# Patient Record
Sex: FEMALE | Race: WHITE
[De-identification: names, ages, dates, MRNs, and addresses within clinical notes are randomized per-mention and may not be internally consistent; named-entity substitution may affect disease eponyms.]

---

## 2022-01-03 ENCOUNTER — HOSPITAL ENCOUNTER (INPATIENT)
Dept: HOSPITAL 4 - SED | Age: 82
LOS: 5 days | Discharge: HOSPICE | DRG: 309 | End: 2022-01-08
Attending: INTERNAL MEDICINE | Admitting: INTERNAL MEDICINE
Payer: COMMERCIAL

## 2022-01-03 VITALS — HEIGHT: 62 IN | BODY MASS INDEX: 25.03 KG/M2 | WEIGHT: 136 LBS

## 2022-01-03 VITALS — SYSTOLIC BLOOD PRESSURE: 92 MMHG

## 2022-01-03 DIAGNOSIS — Z20.822: ICD-10-CM

## 2022-01-03 DIAGNOSIS — I42.0: ICD-10-CM

## 2022-01-03 DIAGNOSIS — Z79.01: ICD-10-CM

## 2022-01-03 DIAGNOSIS — Z86.73: ICD-10-CM

## 2022-01-03 DIAGNOSIS — R62.7: ICD-10-CM

## 2022-01-03 DIAGNOSIS — I13.0: ICD-10-CM

## 2022-01-03 DIAGNOSIS — I48.91: Primary | ICD-10-CM

## 2022-01-03 DIAGNOSIS — F03.90: ICD-10-CM

## 2022-01-03 DIAGNOSIS — E03.9: ICD-10-CM

## 2022-01-03 DIAGNOSIS — E86.0: ICD-10-CM

## 2022-01-03 DIAGNOSIS — I50.9: ICD-10-CM

## 2022-01-03 DIAGNOSIS — N18.2: ICD-10-CM

## 2022-01-03 DIAGNOSIS — N28.9: ICD-10-CM

## 2022-01-03 PROCEDURE — G0378 HOSPITAL OBSERVATION PER HR: HCPCS

## 2022-01-04 VITALS — SYSTOLIC BLOOD PRESSURE: 105 MMHG

## 2022-01-04 VITALS — SYSTOLIC BLOOD PRESSURE: 86 MMHG

## 2022-01-04 LAB
ALBUMIN SERPL BCP-MCNC: 3.3 G/DL (ref 3.4–4.8)
ALBUMIN SERPL BCP-MCNC: 3.4 G/DL (ref 3.4–4.8)
ALT SERPL W P-5'-P-CCNC: 281 U/L (ref 12–78)
ALT SERPL W P-5'-P-CCNC: 287 U/L (ref 12–78)
ANION GAP SERPL CALCULATED.3IONS-SCNC: 11 MMOL/L (ref 5–15)
ANION GAP SERPL CALCULATED.3IONS-SCNC: 14 MMOL/L (ref 5–15)
APPEARANCE UR: CLEAR
AST SERPL W P-5'-P-CCNC: 178 U/L (ref 10–37)
AST SERPL W P-5'-P-CCNC: 214 U/L (ref 10–37)
BACTERIA URNS QL MICRO: (no result) /HPF
BASOPHILS # BLD AUTO: 0 K/UL (ref 0–0.2)
BASOPHILS # BLD AUTO: 0 K/UL (ref 0–0.2)
BASOPHILS NFR BLD AUTO: 0.1 % (ref 0–2)
BASOPHILS NFR BLD AUTO: 0.3 % (ref 0–2)
BILIRUB SERPL-MCNC: 1 MG/DL (ref 0–1)
BILIRUB SERPL-MCNC: 1.1 MG/DL (ref 0–1)
BILIRUB UR QL STRIP: NEGATIVE
BUN SERPL-MCNC: 45 MG/DL (ref 8–21)
BUN SERPL-MCNC: 46 MG/DL (ref 8–21)
CALCIUM SERPL-MCNC: 9.4 MG/DL (ref 8.4–11)
CALCIUM SERPL-MCNC: 9.7 MG/DL (ref 8.4–11)
CHLORIDE SERPL-SCNC: 101 MMOL/L (ref 98–107)
CHLORIDE SERPL-SCNC: 98 MMOL/L (ref 98–107)
COLOR UR: YELLOW
CREAT SERPL-MCNC: 1.5 MG/DL (ref 0.55–1.3)
CREAT SERPL-MCNC: 1.59 MG/DL (ref 0.55–1.3)
DIGOXIN SERPL-MCNC: < 0.1 NG/ML (ref 0.8–2)
EOSINOPHIL # BLD AUTO: 0.1 K/UL (ref 0–0.4)
EOSINOPHIL # BLD AUTO: 0.1 K/UL (ref 0–0.4)
EOSINOPHIL NFR BLD AUTO: 0.8 % (ref 0–4)
EOSINOPHIL NFR BLD AUTO: 1.1 % (ref 0–4)
ERYTHROCYTE [DISTWIDTH] IN BLOOD BY AUTOMATED COUNT: 17.1 % (ref 9–15)
ERYTHROCYTE [DISTWIDTH] IN BLOOD BY AUTOMATED COUNT: 17.8 % (ref 9–15)
GFR SERPL CREATININE-BSD FRML MDRD: (no result) ML/MIN (ref 90–?)
GFR SERPL CREATININE-BSD FRML MDRD: (no result) ML/MIN (ref 90–?)
GLUCOSE SERPL-MCNC: 107 MG/DL (ref 70–99)
GLUCOSE SERPL-MCNC: 108 MG/DL (ref 70–99)
GLUCOSE UR STRIP-MCNC: NEGATIVE MG/DL
HCT VFR BLD AUTO: 48.3 % (ref 36–48)
HCT VFR BLD AUTO: 51.6 % (ref 36–48)
HGB BLD-MCNC: 15.9 G/DL (ref 12–16)
HGB BLD-MCNC: 16.7 G/DL (ref 12–16)
HGB UR QL STRIP: NEGATIVE
KETONES UR STRIP-MCNC: NEGATIVE MG/DL
LEUKOCYTE ESTERASE UR QL STRIP: NEGATIVE
LYMPHOCYTES # BLD AUTO: 1 K/UL (ref 1–5.5)
LYMPHOCYTES # BLD AUTO: 1 K/UL (ref 1–5.5)
LYMPHOCYTES NFR BLD AUTO: 13.1 % (ref 20.5–51.5)
LYMPHOCYTES NFR BLD AUTO: 15.3 % (ref 20.5–51.5)
MCH RBC QN AUTO: 31 PG (ref 27–31)
MCH RBC QN AUTO: 31 PG (ref 27–31)
MCHC RBC AUTO-ENTMCNC: 32 % (ref 32–36)
MCHC RBC AUTO-ENTMCNC: 33 % (ref 32–36)
MCV RBC AUTO: 94 FL (ref 79–98)
MCV RBC AUTO: 95 FL (ref 79–98)
MONOCYTES # BLD MANUAL: 0.5 K/UL (ref 0–1)
MONOCYTES # BLD MANUAL: 0.5 K/UL (ref 0–1)
MONOCYTES # BLD MANUAL: 6.1 % (ref 1.7–9.3)
MONOCYTES # BLD MANUAL: 7.5 % (ref 1.7–9.3)
MUCOUS THREADS URNS QL MICRO: (no result) /LPF
NEUTROPHILS # BLD AUTO: 5 K/UL (ref 1.8–7.7)
NEUTROPHILS # BLD AUTO: 5.9 K/UL (ref 1.8–7.7)
NEUTROPHILS NFR BLD AUTO: 75.8 % (ref 40–70)
NEUTROPHILS NFR BLD AUTO: 79.9 % (ref 40–70)
NITRITE UR QL STRIP: NEGATIVE
PH UR STRIP: 6 [PH] (ref 5–8)
PLATELET # BLD AUTO: 133 K/UL (ref 130–430)
PLATELET # BLD AUTO: 137 K/UL (ref 130–430)
POTASSIUM SERPL-SCNC: 4.1 MMOL/L (ref 3.5–5.1)
POTASSIUM SERPL-SCNC: 4.6 MMOL/L (ref 3.5–5.1)
PROT UR QL STRIP: (no result)
RBC # BLD AUTO: 5.13 MIL/UL (ref 4.2–6.2)
RBC # BLD AUTO: 5.45 MIL/UL (ref 4.2–6.2)
RBC #/AREA URNS HPF: (no result) /HPF (ref 0–3)
SODIUM SERPLBLD-SCNC: 135 MMOL/L (ref 136–145)
SODIUM SERPLBLD-SCNC: 136 MMOL/L (ref 136–145)
SP GR UR STRIP: >=1.03 (ref 1–1.03)
T4 FREE SERPL-MCNC: 1.1 NG/DL (ref 0.8–1.5)
TSH SERPL DL<=0.05 MIU/L-ACNC: 8.36 UIU/ML (ref 0.36–3.74)
UROBILINOGEN UR STRIP-MCNC: 0.2 MG/DL (ref 0.2–1)
WBC # BLD AUTO: 6.6 K/UL (ref 4.8–10.8)
WBC # BLD AUTO: 7.4 K/UL (ref 4.8–10.8)
WBC #/AREA URNS HPF: (no result) /HPF (ref 0–3)

## 2022-01-04 RX ADMIN — MIRTAZAPINE SCH MG: 15 TABLET, FILM COATED ORAL at 21:06

## 2022-01-04 RX ADMIN — SODIUM CHLORIDE SCH MLS/HR: 9 INJECTION, SOLUTION INTRAVENOUS at 15:49

## 2022-01-04 RX ADMIN — CARVEDILOL SCH MG: 6.25 TABLET, FILM COATED ORAL at 21:38

## 2022-01-04 RX ADMIN — RIVAROXABAN SCH MG: 15 TABLET, FILM COATED ORAL at 18:37

## 2022-01-04 RX ADMIN — DILTIAZEM HYDROCHLORIDE SCH MG: 30 TABLET, FILM COATED ORAL at 22:36

## 2022-01-04 RX ADMIN — DILTIAZEM HYDROCHLORIDE SCH MG: 30 TABLET, FILM COATED ORAL at 15:59

## 2022-01-04 NOTE — NUR
Patient to ER bed 7 to gown for evaluation. Side rails up.

Report given to self. [Courtesy Letter:] : I had the pleasure of seeing your patient, [unfilled], in my office today. [Please see my note below.] : Please see my note below. [Dear  ___] : Dear  [unfilled], [Sincerely,] : Sincerely, [Consult Closing:] : Thank you very much for allowing me to participate in the care of this patient.  If you have any questions, please do not hesitate to contact me. [FreeTextEntry2] : Kajal Pope MD\par [address] \par [place] [address]\par Phone: [phone]  [FreeTextEntry3] : Pippa Madison MD, MPH\par Attending Physician\par [facility]\par Hematology /Oncology and Stem Cell Transplantation\par  of Pediatrics\par [employer] [facility]

## 2022-01-04 NOTE — NUR
Received pt from ER at 1345 on stretcher.  A+Ox1- name only. Pt denies pain. IV noted to R 
arm. CSMW- pt appears pale. No headahce, dizziness, chest pain, numbness, tingling or edema. 
Lungs diminished No SOB/cough noted. 100% RA. No palpitations reported from pt. BSx4. LBM 
Dennys 3/22. Void QS. No N+V. No skin concerns. L breast swollen/firm. Not OOB. Tele insitu. pt 
knows top ring call bell to get up. Bed alarm on. VSS. Will continue to monitor.

## 2022-01-04 NOTE — NUR
Spoke with Dr Villalobos to inform patient is is AFib confirmed by stat EKG done. 
Orders received. This RN to carry out. 

-------------------------------------------------------------------------------

Addendum: 01/04/22 at 1239 by [email or web address]

-------------------------------------------------------------------------------

*Is in

## 2022-01-04 NOTE — NUR
Pt resting in bed. No voiced concerns. Bed in low position. Call bell in reach. Bed alarm 
on. IVF infusing. Will continue to monitor.

## 2022-01-04 NOTE — NUR
Patient will be admitted to care of Dr Villalobos.  Admitted to med surg unit.  
Will go to room 130A.  Belongings list completed.  Complete and up to date 
summary report printed. SBAR report to be given at bedside with opportunity for 
questions.

## 2022-01-04 NOTE — NUR
# 20 gauge angiocath placed to 20.  Use of asceptic technique.  Opsite placed 
over site.  Blood return noted.  Flushed with 10 cc of normal saline.  No 
evidence of infiltration noted.  Patient tolerated well.

## 2022-01-04 NOTE — NUR
Made contact with patient. Easily arousable to voice. Alert and oriented to 
person, place and situation. COVID swab collected at bedside and sent to lab. 
Informed we are awaiting results to send her to hospital room. Understanding 
verbalized. Will continue to monitor.

## 2022-01-05 VITALS — SYSTOLIC BLOOD PRESSURE: 114 MMHG

## 2022-01-05 VITALS — SYSTOLIC BLOOD PRESSURE: 105 MMHG

## 2022-01-05 VITALS — SYSTOLIC BLOOD PRESSURE: 137 MMHG

## 2022-01-05 VITALS — SYSTOLIC BLOOD PRESSURE: 96 MMHG

## 2022-01-05 VITALS — SYSTOLIC BLOOD PRESSURE: 156 MMHG

## 2022-01-05 LAB
ALBUMIN SERPL BCP-MCNC: 2.9 G/DL (ref 3.4–4.8)
ALT SERPL W P-5'-P-CCNC: 215 U/L (ref 12–78)
ANION GAP SERPL CALCULATED.3IONS-SCNC: 15 MMOL/L (ref 5–15)
AST SERPL W P-5'-P-CCNC: 116 U/L (ref 10–37)
BASOPHILS # BLD AUTO: 0 K/UL (ref 0–0.2)
BASOPHILS NFR BLD AUTO: 0.1 % (ref 0–2)
BILIRUB SERPL-MCNC: 1 MG/DL (ref 0–1)
BUN SERPL-MCNC: 43 MG/DL (ref 8–21)
CALCIUM SERPL-MCNC: 8.5 MG/DL (ref 8.4–11)
CHLORIDE SERPL-SCNC: 102 MMOL/L (ref 98–107)
CREAT SERPL-MCNC: 1.32 MG/DL (ref 0.55–1.3)
EOSINOPHIL # BLD AUTO: 0 K/UL (ref 0–0.4)
EOSINOPHIL NFR BLD AUTO: 0.4 % (ref 0–4)
ERYTHROCYTE [DISTWIDTH] IN BLOOD BY AUTOMATED COUNT: 16.8 % (ref 9–15)
GFR SERPL CREATININE-BSD FRML MDRD: (no result) ML/MIN (ref 90–?)
GLUCOSE SERPL-MCNC: 103 MG/DL (ref 70–99)
HCT VFR BLD AUTO: 45.6 % (ref 36–48)
HGB BLD-MCNC: 15.1 G/DL (ref 12–16)
LIPASE SERPL-CCNC: 373 U/L (ref 73–393)
LYMPHOCYTES # BLD AUTO: 0.7 K/UL (ref 1–5.5)
LYMPHOCYTES NFR BLD AUTO: 11.1 % (ref 20.5–51.5)
MCH RBC QN AUTO: 31 PG (ref 27–31)
MCHC RBC AUTO-ENTMCNC: 33 % (ref 32–36)
MCV RBC AUTO: 93 FL (ref 79–98)
MONOCYTES # BLD MANUAL: 0.3 K/UL (ref 0–1)
MONOCYTES # BLD MANUAL: 5 % (ref 1.7–9.3)
NEUTROPHILS # BLD AUTO: 5.5 K/UL (ref 1.8–7.7)
NEUTROPHILS NFR BLD AUTO: 83.4 % (ref 40–70)
PLATELET # BLD AUTO: 113 K/UL (ref 130–430)
POTASSIUM SERPL-SCNC: 4.5 MMOL/L (ref 3.5–5.1)
RBC # BLD AUTO: 4.91 MIL/UL (ref 4.2–6.2)
SODIUM SERPLBLD-SCNC: 133 MMOL/L (ref 136–145)
WBC # BLD AUTO: 6.6 K/UL (ref 4.8–10.8)

## 2022-01-05 RX ADMIN — RIVAROXABAN SCH MG: 15 TABLET, FILM COATED ORAL at 18:14

## 2022-01-05 RX ADMIN — MIRTAZAPINE SCH MG: 15 TABLET, FILM COATED ORAL at 21:04

## 2022-01-05 RX ADMIN — DILTIAZEM HYDROCHLORIDE SCH MG: 30 TABLET, FILM COATED ORAL at 15:01

## 2022-01-05 RX ADMIN — SODIUM CHLORIDE SCH MLS/HR: 9 INJECTION, SOLUTION INTRAVENOUS at 03:09

## 2022-01-05 RX ADMIN — CARVEDILOL SCH MG: 6.25 TABLET, FILM COATED ORAL at 21:04

## 2022-01-05 RX ADMIN — DILTIAZEM HYDROCHLORIDE SCH MG: 30 TABLET, FILM COATED ORAL at 06:33

## 2022-01-05 RX ADMIN — DILTIAZEM HYDROCHLORIDE SCH MG: 30 TABLET, FILM COATED ORAL at 22:00

## 2022-01-05 RX ADMIN — CARVEDILOL SCH MG: 6.25 TABLET, FILM COATED ORAL at 08:27

## 2022-01-05 RX ADMIN — FAMOTIDINE SCH MG: 20 TABLET, FILM COATED ORAL at 08:26

## 2022-01-05 NOTE — NUR
NOTES:

VS checked. ambulated with assist to the restroom and voided. on cardiac monitor and shows 
atrial fib with RVR.on room air, no sob.

## 2022-01-05 NOTE — NUR
Call placed to [facility] [phone]-patient's daughter to discuss DC planning-left 
message and asked for return call

## 2022-01-05 NOTE — NUR
Pt laying in bed at 0700. A+Ox3- not to time. Pt denies pain. IV noted- S/L. CSMW- pt 
appears pale. No headahce, dizziness, chest pain, numbness, tingling or edema. Lungs 
diminished. No SOB/cough noted. 93% RA. No palpitations reported from pt. BSx4. LBM Dennys 
3/22. Void QS. No N+V. No skin concerns. Not OOB. Tele insitu. pt knows top ring call bell 
to get up. R arm dressing from tape being pulled off/IV. Bed alarm on. VSS. Will continue to 
monitor.

## 2022-01-05 NOTE — NUR
CHANGE OF SHOFT;

endorsed by day shift, no IV access, called Dr. Villalobos x4 on day shift but no response. no 
distress. call ligth within reach.

## 2022-01-05 NOTE — NUR
Pt resting in bed. No voiced concerns. Bed in low position. Call bell in reach. Bed alarm 
on. Will continue to monitor.

## 2022-01-05 NOTE — NUR
NOTES;

charge nurse and another RN  attempted to insert IV but unsuccessful. called Dr. Mckeon, 
on call for Dr. Villalobos. informed about no IV access, encourage oral fluids.

## 2022-01-06 VITALS — SYSTOLIC BLOOD PRESSURE: 97 MMHG

## 2022-01-06 VITALS — SYSTOLIC BLOOD PRESSURE: 128 MMHG

## 2022-01-06 RX ADMIN — CARVEDILOL SCH MG: 6.25 TABLET, FILM COATED ORAL at 09:52

## 2022-01-06 RX ADMIN — FAMOTIDINE SCH MG: 20 TABLET, FILM COATED ORAL at 08:58

## 2022-01-06 RX ADMIN — DILTIAZEM HYDROCHLORIDE SCH MG: 30 TABLET, FILM COATED ORAL at 05:58

## 2022-01-06 RX ADMIN — SODIUM CHLORIDE SCH MLS/HR: 9 INJECTION, SOLUTION INTRAVENOUS at 01:32

## 2022-01-06 RX ADMIN — DILTIAZEM HYDROCHLORIDE SCH MG: 30 TABLET, FILM COATED ORAL at 21:52

## 2022-01-06 RX ADMIN — MIRTAZAPINE SCH MG: 15 TABLET, FILM COATED ORAL at 21:44

## 2022-01-06 RX ADMIN — DILTIAZEM HYDROCHLORIDE SCH MG: 30 TABLET, FILM COATED ORAL at 14:58

## 2022-01-06 RX ADMIN — FAMOTIDINE SCH MG: 20 TABLET, FILM COATED ORAL at 09:02

## 2022-01-06 RX ADMIN — RIVAROXABAN SCH MG: 15 TABLET, FILM COATED ORAL at 21:50

## 2022-01-06 NOTE — NUR
NOTES:

pt. awakened, replace battery on the tele monitor. quite disoriented, reoriented to place. 
condition observed.

## 2022-01-06 NOTE — NUR
Dietitian Recommendations



* Regular diet w/ Ensure Enlive TID

(ONS yields 1050 kcal/day, 60 gm protein/day)

* Encourage increase PO intakes

LP, RD



Please refer to Nutrition Assessment for details.

-------------------------------------------------------------------------------

Addendum: 01/06/22 at 1809 by Carmen Carmichael RD

-------------------------------------------------------------------------------

Amended: Links added.

## 2022-01-06 NOTE — NUR
CHANGE OF SHIFT;

endorsed by day shift. did not void all day. no acute distress. bed alarm on, fall risk.

## 2022-01-06 NOTE — NUR
NOTES:

pt. called, pt. assisted to [facility], very weak to ambulate. pt. voided with CNA help. still no 
IV access. encourage oral liquids. on cardiac monitor and shows atrial fib/RVR. denies any 
pain. appears drowsy and sleepy. bed alarm on, fall risk.

## 2022-01-06 NOTE — NUR
CLOSING NOTES;

pt. called and assisted to the restroom, pt. very unsteady and weak. fall risk. still no IV 
access. for further care and assistance. bed aalarm on.

## 2022-01-07 VITALS — SYSTOLIC BLOOD PRESSURE: 107 MMHG

## 2022-01-07 VITALS — SYSTOLIC BLOOD PRESSURE: 116 MMHG

## 2022-01-07 VITALS — SYSTOLIC BLOOD PRESSURE: 120 MMHG

## 2022-01-07 VITALS — SYSTOLIC BLOOD PRESSURE: 155 MMHG

## 2022-01-07 VITALS — SYSTOLIC BLOOD PRESSURE: 108 MMHG

## 2022-01-07 VITALS — SYSTOLIC BLOOD PRESSURE: 118 MMHG

## 2022-01-07 LAB
ALBUMIN SERPL BCP-MCNC: 3.4 G/DL (ref 3.4–4.8)
ALT SERPL W P-5'-P-CCNC: 227 U/L (ref 12–78)
ANION GAP SERPL CALCULATED.3IONS-SCNC: 17 MMOL/L (ref 5–15)
AST SERPL W P-5'-P-CCNC: 106 U/L (ref 10–37)
BASOPHILS # BLD AUTO: 0 K/UL (ref 0–0.2)
BASOPHILS NFR BLD AUTO: 0.5 % (ref 0–2)
BILIRUB SERPL-MCNC: 1.1 MG/DL (ref 0–1)
BUN SERPL-MCNC: 47 MG/DL (ref 8–21)
CALCIUM SERPL-MCNC: 9 MG/DL (ref 8.4–11)
CHLORIDE SERPL-SCNC: 101 MMOL/L (ref 98–107)
CREAT SERPL-MCNC: 1.41 MG/DL (ref 0.55–1.3)
EOSINOPHIL # BLD AUTO: 0 K/UL (ref 0–0.4)
EOSINOPHIL NFR BLD AUTO: 0.3 % (ref 0–4)
ERYTHROCYTE [DISTWIDTH] IN BLOOD BY AUTOMATED COUNT: 17.3 % (ref 9–15)
GFR SERPL CREATININE-BSD FRML MDRD: (no result) ML/MIN (ref 90–?)
GLUCOSE SERPL-MCNC: 98 MG/DL (ref 70–99)
HCT VFR BLD AUTO: 52.2 % (ref 36–48)
HGB BLD-MCNC: 17 G/DL (ref 12–16)
LYMPHOCYTES # BLD AUTO: 0.8 K/UL (ref 1–5.5)
LYMPHOCYTES NFR BLD AUTO: 11.1 % (ref 20.5–51.5)
MCH RBC QN AUTO: 31 PG (ref 27–31)
MCHC RBC AUTO-ENTMCNC: 33 % (ref 32–36)
MCV RBC AUTO: 94 FL (ref 79–98)
MONOCYTES # BLD MANUAL: 0.5 K/UL (ref 0–1)
MONOCYTES # BLD MANUAL: 6.1 % (ref 1.7–9.3)
NEUTROPHILS # BLD AUTO: 6.2 K/UL (ref 1.8–7.7)
NEUTROPHILS NFR BLD AUTO: 82 % (ref 40–70)
PLATELET # BLD AUTO: 116 K/UL (ref 130–430)
POTASSIUM SERPL-SCNC: 5.8 MMOL/L (ref 3.5–5.1)
RBC # BLD AUTO: 5.56 MIL/UL (ref 4.2–6.2)
SODIUM SERPLBLD-SCNC: 134 MMOL/L (ref 136–145)
WBC # BLD AUTO: 7.5 K/UL (ref 4.8–10.8)

## 2022-01-07 RX ADMIN — FUROSEMIDE SCH MG: 40 TABLET ORAL at 09:00

## 2022-01-07 RX ADMIN — FAMOTIDINE SCH MG: 20 TABLET, FILM COATED ORAL at 09:25

## 2022-01-07 RX ADMIN — RIVAROXABAN SCH MG: 15 TABLET, FILM COATED ORAL at 18:08

## 2022-01-07 RX ADMIN — CARVEDILOL SCH MG: 6.25 TABLET, FILM COATED ORAL at 09:00

## 2022-01-07 RX ADMIN — DILTIAZEM HYDROCHLORIDE SCH MG: 30 TABLET, FILM COATED ORAL at 05:53

## 2022-01-07 RX ADMIN — DILTIAZEM HYDROCHLORIDE SCH MG: 30 TABLET, FILM COATED ORAL at 14:12

## 2022-01-07 RX ADMIN — FUROSEMIDE SCH MG: 40 TABLET ORAL at 11:09

## 2022-01-07 RX ADMIN — CARVEDILOL SCH MG: 6.25 TABLET, FILM COATED ORAL at 11:10

## 2022-01-07 RX ADMIN — DILTIAZEM HYDROCHLORIDE SCH MG: 30 TABLET, FILM COATED ORAL at 22:34

## 2022-01-07 RX ADMIN — CARVEDILOL SCH MG: 6.25 TABLET, FILM COATED ORAL at 00:25

## 2022-01-07 RX ADMIN — MIRTAZAPINE SCH MG: 15 TABLET, FILM COATED ORAL at 21:33

## 2022-01-07 NOTE — NUR
CLOSING NOTES;

pt. been sleeping most of the night but arousable. been encouraging oral fluids. due 
medication given. HR still above 100. for further care and assist. will endorse to incoming 
shift. bed alarm on.

## 2022-01-07 NOTE — NUR
Nutrition Update



Chas Scale 18 noted.

Pt admitted for Dehydration

Diet: Regular

BMI: 24.9 kg/m2

RD to follow per nutrition care standards.

## 2022-01-07 NOTE — NUR
attempted to sit down on chair, tolerated but no sitter to watch her so, placed back to bed, 
she removes telebox, need to monitor and check every now and then. Heart rate still high at 
more than 100bpm, refused lunch.

## 2022-01-08 VITALS — SYSTOLIC BLOOD PRESSURE: 131 MMHG

## 2022-01-08 VITALS — SYSTOLIC BLOOD PRESSURE: 105 MMHG

## 2022-01-08 VITALS — SYSTOLIC BLOOD PRESSURE: 150 MMHG

## 2022-01-08 RX ADMIN — CARVEDILOL SCH MG: 6.25 TABLET, FILM COATED ORAL at 13:51

## 2022-01-08 RX ADMIN — FAMOTIDINE SCH MG: 20 TABLET, FILM COATED ORAL at 08:41

## 2022-01-08 RX ADMIN — DILTIAZEM HYDROCHLORIDE SCH MG: 30 TABLET, FILM COATED ORAL at 13:52

## 2022-01-08 RX ADMIN — DILTIAZEM HYDROCHLORIDE SCH MG: 30 TABLET, FILM COATED ORAL at 05:05

## 2022-01-08 RX ADMIN — FUROSEMIDE SCH MG: 40 TABLET ORAL at 08:41

## 2022-01-08 RX ADMIN — RIVAROXABAN SCH MG: 15 TABLET, FILM COATED ORAL at 17:54

## 2022-01-08 RX ADMIN — CARVEDILOL SCH MG: 6.25 TABLET, FILM COATED ORAL at 09:00

## 2022-01-08 NOTE — NUR
Hospice Agency:

Spoke with Kimberly from [facility] and has arranged home Hospice and  transport 
coming in 2hours and daughter Magdalena was informed.Instructions from [facility] to 
fax patient's 24 hours home medications list to Fax# [phone]#[phone].

## 2022-01-08 NOTE — NUR
PATIENT PULLING OUT TELE:

PUT BACK TO TELEMETRY,PATIENT KEEPS ON PULLING OUT THE TELE AFTER PUTTING IT BACK.

## 2022-01-08 NOTE — NUR
DC HOME WITH HOSPICE:

REPORT GIVEN TO MICHELLE AND DC INSTRUCTIONS PAPER GIVEN TO EMT [facility] AND 
PERSONAL BELONGINGS GIVEN AS WELL.NO IV. ID BAND REMOVED AND REPLACED WITH [facility] . 
PATIENT'S DAUGHTER CARINE AWARE WITH TRANSPORTATION ARRANGED BY [facility] 
HOME.CODE STATUS DNR.PATIENT WAS TRANSPORTED TO HOME VIA [facility] IN 
STABLE CONDITION.